# Patient Record
Sex: FEMALE | Race: WHITE | HISPANIC OR LATINO
[De-identification: names, ages, dates, MRNs, and addresses within clinical notes are randomized per-mention and may not be internally consistent; named-entity substitution may affect disease eponyms.]

---

## 2020-12-23 ENCOUNTER — APPOINTMENT (OUTPATIENT)
Dept: PHYSICAL MEDICINE AND REHAB | Facility: CLINIC | Age: 35
End: 2020-12-23
Payer: COMMERCIAL

## 2020-12-23 VITALS — BODY MASS INDEX: 21.71 KG/M2 | HEIGHT: 62 IN | WEIGHT: 118 LBS

## 2020-12-23 DIAGNOSIS — Z78.9 OTHER SPECIFIED HEALTH STATUS: ICD-10-CM

## 2020-12-23 DIAGNOSIS — M54.12 RADICULOPATHY, CERVICAL REGION: ICD-10-CM

## 2020-12-23 DIAGNOSIS — M50.20 OTHER CERVICAL DISC DISPLACEMENT, UNSPECIFIED CERVICAL REGION: ICD-10-CM

## 2020-12-23 PROCEDURE — 99072 ADDL SUPL MATRL&STAF TM PHE: CPT

## 2020-12-23 PROCEDURE — 99204 OFFICE O/P NEW MOD 45 MIN: CPT

## 2020-12-23 NOTE — PHYSICAL EXAM
[FreeTextEntry1] : KENDALL is a 35 year  yr old female \par \par Constitutional: healthy appearing, NAD, and normal body habitus\par \par NECK\par ROM: flexion to 30, ext to 30, rotation to 70 deg to left and 80 to right\par \par Inspection: no erythema, warmth\par Spine: no TTP in spinous process\par Soft tissue palpation: no TTP in cervical paraspinals, rhomboids, TTP in left trapezius\par \par 5/5 bilateral elb flex/ext, WE, finger abd/flex\par sensation intact in bilat UE\par reflexes:  biceps and triceps 1+ bilat\par \par Special tests: neg [place], [place]\par \par

## 2020-12-23 NOTE — ASSESSMENT
[FreeTextEntry1] : 2016 Xray reviewed from [facility], and MRI brain from [facility]. 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  Educated to do so given headaches. \par \par Might be pregnant so cannot take nsaids.  Ice area often.  Improve posture and laptop setup.  Limit head stands in yoga.  Does not want TPI yet.

## 2020-12-23 NOTE — REVIEW OF SYSTEMS
[Headache] : headache [Negative] : Heme/Lymph [Dizziness] : no dizziness [Fainting] : no fainting [Difficulty Walking] : no difficulty walking

## 2020-12-23 NOTE — HISTORY OF PRESENT ILLNESS
[FreeTextEntry1] : Location: neck\par Quality: achy\par Severity: 3/10\par Duration: worse in last wk\par Timing: chronic, since 2016\par Context: atraumatic but does yoga\par Aggravating Factors: moving neck\par Alleviating Factors: rest, ice, muscle relaxer\par Associated Symptoms: denies weight loss, fever, chills, change in bowel/bladder habits, redness, warmth, weakness, numbness/tingling, radiation down left arm\par Prior Studies: xray 2016\par